# Patient Record
Sex: FEMALE | Race: WHITE | NOT HISPANIC OR LATINO | URBAN - METROPOLITAN AREA
[De-identification: names, ages, dates, MRNs, and addresses within clinical notes are randomized per-mention and may not be internally consistent; named-entity substitution may affect disease eponyms.]

---

## 2023-05-26 ENCOUNTER — HOSPITAL ENCOUNTER (OUTPATIENT)
Dept: RADIOLOGY | Facility: HOSPITAL | Age: 38
Discharge: HOME | End: 2023-05-26
Payer: COMMERCIAL

## 2023-05-26 DIAGNOSIS — Z12.31 SCREENING MAMMOGRAM, ENCOUNTER FOR: ICD-10-CM

## 2023-05-26 PROCEDURE — 77067 MAMMO DIGITAL SCREENING BILAT WITH TOMO: ICD-10-PCS | Mod: 26,,, | Performed by: RADIOLOGY

## 2023-05-26 PROCEDURE — 77063 MAMMO DIGITAL SCREENING BILAT WITH TOMO: ICD-10-PCS | Mod: 26,,, | Performed by: RADIOLOGY

## 2023-05-26 PROCEDURE — 77067 SCR MAMMO BI INCL CAD: CPT | Mod: 26,,, | Performed by: RADIOLOGY

## 2023-05-26 PROCEDURE — 77063 BREAST TOMOSYNTHESIS BI: CPT | Mod: 26,,, | Performed by: RADIOLOGY

## 2023-05-26 PROCEDURE — 77067 SCR MAMMO BI INCL CAD: CPT | Mod: TC,PN

## 2023-05-30 ENCOUNTER — TELEPHONE (OUTPATIENT)
Dept: RADIOLOGY | Facility: HOSPITAL | Age: 38
End: 2023-05-30
Payer: COMMERCIAL

## 2023-05-31 ENCOUNTER — TELEPHONE (OUTPATIENT)
Dept: RADIOLOGY | Facility: HOSPITAL | Age: 38
End: 2023-05-31
Payer: COMMERCIAL

## 2023-05-31 ENCOUNTER — TELEPHONE (OUTPATIENT)
Dept: OBSTETRICS AND GYNECOLOGY | Facility: HOSPITAL | Age: 38
End: 2023-05-31
Payer: COMMERCIAL

## 2023-05-31 DIAGNOSIS — R92.8 ABNORMAL MAMMOGRAM OF LEFT BREAST: Primary | ICD-10-CM

## 2023-05-31 NOTE — TELEPHONE ENCOUNTER
I spoke with patient regarding the status of her being scheduled for a diagnostic mammogram with US. I have not received the requested orders, I sent a message to patient's providers.

## 2023-05-31 NOTE — TELEPHONE ENCOUNTER
----- Message from Khadijah Guzman RT sent at 5/31/2023  1:00 PM CDT -----  Patient had an abnormal  mammogram screen on 5-26-23.  I am requesting an order for her to have a left breast diagnostic mammogram with a left breast US if needed.   Thank you, Khadijah

## 2023-06-02 ENCOUNTER — HOSPITAL ENCOUNTER (OUTPATIENT)
Dept: RADIOLOGY | Facility: HOSPITAL | Age: 38
Discharge: HOME | End: 2023-06-02
Attending: OBSTETRICS & GYNECOLOGY
Payer: COMMERCIAL

## 2023-06-02 DIAGNOSIS — R92.8 ABNORMAL MAMMOGRAM OF LEFT BREAST: ICD-10-CM

## 2023-06-02 PROCEDURE — 76642 ULTRASOUND BREAST LIMITED: CPT | Mod: TC,PO,LT

## 2023-06-02 PROCEDURE — 76642 ULTRASOUND BREAST LIMITED: CPT | Mod: 26,LT,, | Performed by: RADIOLOGY

## 2023-06-02 PROCEDURE — 76642 US BREAST LEFT LIMITED: ICD-10-PCS | Mod: 26,LT,, | Performed by: RADIOLOGY

## 2023-12-07 ENCOUNTER — HOSPITAL ENCOUNTER (OUTPATIENT)
Dept: RADIOLOGY | Facility: HOSPITAL | Age: 38
Discharge: HOME | End: 2023-12-07
Attending: OBSTETRICS & GYNECOLOGY
Payer: COMMERCIAL

## 2023-12-07 DIAGNOSIS — R92.8 ABNORMAL MAMMOGRAM OF LEFT BREAST: ICD-10-CM

## 2023-12-07 PROCEDURE — 76642 ULTRASOUND BREAST LIMITED: CPT | Mod: TC,PO,LT

## 2023-12-07 PROCEDURE — 76642 ULTRASOUND BREAST LIMITED: CPT | Mod: 26,LT,, | Performed by: RADIOLOGY

## 2023-12-07 PROCEDURE — 77065 DX MAMMO INCL CAD UNI: CPT | Mod: 26,LT,, | Performed by: RADIOLOGY

## 2023-12-07 PROCEDURE — 77065 DX MAMMO INCL CAD UNI: CPT | Mod: TC,PO,LT

## 2023-12-07 PROCEDURE — 77061 MAMMO DIGITAL DIAGNOSTIC LEFT WITH TOMO: ICD-10-PCS | Mod: 26,LT,, | Performed by: RADIOLOGY

## 2023-12-07 PROCEDURE — 77061 BREAST TOMOSYNTHESIS UNI: CPT | Mod: 26,LT,, | Performed by: RADIOLOGY

## 2023-12-07 PROCEDURE — 76642 US BREAST LEFT LIMITED: ICD-10-PCS | Mod: 26,LT,, | Performed by: RADIOLOGY

## 2023-12-07 PROCEDURE — 77065 MAMMO DIGITAL DIAGNOSTIC LEFT WITH TOMO: ICD-10-PCS | Mod: 26,LT,, | Performed by: RADIOLOGY

## 2024-01-27 DIAGNOSIS — R92.8 ABNORMAL MAMMOGRAM: Primary | ICD-10-CM

## 2024-06-07 ENCOUNTER — TELEPHONE (OUTPATIENT)
Dept: MATERNAL FETAL MEDICINE | Facility: CLINIC | Age: 39
End: 2024-06-07

## 2024-06-07 ENCOUNTER — HOSPITAL ENCOUNTER (OUTPATIENT)
Dept: RADIOLOGY | Facility: HOSPITAL | Age: 39
Discharge: HOME | End: 2024-06-07
Attending: OBSTETRICS & GYNECOLOGY
Payer: COMMERCIAL

## 2024-06-07 DIAGNOSIS — R92.8 ABNORMAL MAMMOGRAM: ICD-10-CM

## 2024-06-07 DIAGNOSIS — Z91.89 AT HIGH RISK FOR BREAST CANCER: Primary | ICD-10-CM

## 2024-06-07 DIAGNOSIS — Z36.89 ENCOUNTER FOR FETAL ANATOMIC SURVEY: ICD-10-CM

## 2024-06-07 DIAGNOSIS — O09.522 MULTIGRAVIDA OF ADVANCED MATERNAL AGE IN SECOND TRIMESTER: Primary | ICD-10-CM

## 2024-06-07 PROCEDURE — 76642 ULTRASOUND BREAST LIMITED: CPT | Mod: 26,LT,, | Performed by: RADIOLOGY

## 2024-06-07 PROCEDURE — 76642 ULTRASOUND BREAST LIMITED: CPT | Mod: TC,PO,LT

## 2024-06-07 PROCEDURE — 77062 BREAST TOMOSYNTHESIS BI: CPT | Mod: 26,,, | Performed by: RADIOLOGY

## 2024-06-07 PROCEDURE — 77062 BREAST TOMOSYNTHESIS BI: CPT | Mod: TC,PO

## 2024-06-07 PROCEDURE — 77066 DX MAMMO INCL CAD BI: CPT | Mod: 26,,, | Performed by: RADIOLOGY

## 2024-06-07 RX ORDER — METRONIDAZOLE 500 MG/1
1 TABLET ORAL 2 TIMES DAILY
COMMUNITY

## 2024-06-07 RX ORDER — AZELAIC ACID 0.15 G/G
AEROSOL, FOAM TOPICAL
COMMUNITY

## 2024-06-07 RX ORDER — BENZOYL PEROXIDE 50 MG/G
1 CREAM TOPICAL DAILY
COMMUNITY

## 2024-07-10 ENCOUNTER — OFFICE VISIT (OUTPATIENT)
Dept: MATERNAL FETAL MEDICINE | Facility: CLINIC | Age: 39
End: 2024-07-10
Payer: COMMERCIAL

## 2024-07-10 ENCOUNTER — ANESTHESIA EVENT (OUTPATIENT)
Dept: OBSTETRICS AND GYNECOLOGY | Facility: OTHER | Age: 39
End: 2024-07-10
Payer: COMMERCIAL

## 2024-07-10 ENCOUNTER — TELEPHONE (OUTPATIENT)
Dept: MATERNAL FETAL MEDICINE | Facility: CLINIC | Age: 39
End: 2024-07-10
Payer: COMMERCIAL

## 2024-07-10 ENCOUNTER — HOSPITAL ENCOUNTER (OUTPATIENT)
Facility: OTHER | Age: 39
Discharge: HOME | End: 2024-07-11
Attending: OBSTETRICS & GYNECOLOGY | Admitting: OBSTETRICS & GYNECOLOGY
Payer: COMMERCIAL

## 2024-07-10 ENCOUNTER — PROCEDURE VISIT (OUTPATIENT)
Dept: MATERNAL FETAL MEDICINE | Facility: CLINIC | Age: 39
End: 2024-07-10
Payer: COMMERCIAL

## 2024-07-10 ENCOUNTER — ANESTHESIA (OUTPATIENT)
Dept: OBSTETRICS AND GYNECOLOGY | Facility: OTHER | Age: 39
End: 2024-07-10
Payer: COMMERCIAL

## 2024-07-10 VITALS
DIASTOLIC BLOOD PRESSURE: 78 MMHG | HEART RATE: 100 BPM | SYSTOLIC BLOOD PRESSURE: 123 MMHG | HEIGHT: 66 IN | BODY MASS INDEX: 30.41 KG/M2 | WEIGHT: 189.25 LBS

## 2024-07-10 DIAGNOSIS — N88.3 SHORT CERVIX: Primary | ICD-10-CM

## 2024-07-10 DIAGNOSIS — O26.879 SHORT CERVIX AFFECTING PREGNANCY: ICD-10-CM

## 2024-07-10 DIAGNOSIS — Z36.89 ENCOUNTER FOR FETAL ANATOMIC SURVEY: ICD-10-CM

## 2024-07-10 DIAGNOSIS — O09.812 PREGNANCY RESULTING FROM IN VITRO FERTILIZATION IN SECOND TRIMESTER: Primary | ICD-10-CM

## 2024-07-10 DIAGNOSIS — D25.9 UTERINE FIBROIDS AFFECTING PREGNANCY IN SECOND TRIMESTER: ICD-10-CM

## 2024-07-10 DIAGNOSIS — O09.512 ADVANCED MATERNAL AGE, 1ST PREGNANCY, SECOND TRIMESTER: ICD-10-CM

## 2024-07-10 DIAGNOSIS — O26.872 SHORT CERVIX DURING PREGNANCY IN SECOND TRIMESTER: ICD-10-CM

## 2024-07-10 DIAGNOSIS — O34.12 UTERINE FIBROIDS AFFECTING PREGNANCY IN SECOND TRIMESTER: ICD-10-CM

## 2024-07-10 DIAGNOSIS — O09.522 MULTIGRAVIDA OF ADVANCED MATERNAL AGE IN SECOND TRIMESTER: ICD-10-CM

## 2024-07-10 LAB
ABO + RH BLD: NORMAL
BASOPHILS # BLD AUTO: 0.05 K/UL (ref 0–0.2)
BASOPHILS NFR BLD: 0.4 % (ref 0–1.9)
BLD GP AB SCN CELLS X3 SERPL QL: NORMAL
DIFFERENTIAL METHOD BLD: ABNORMAL
EOSINOPHIL # BLD AUTO: 0.1 K/UL (ref 0–0.5)
EOSINOPHIL NFR BLD: 1 % (ref 0–8)
ERYTHROCYTE [DISTWIDTH] IN BLOOD BY AUTOMATED COUNT: 13 % (ref 11.5–14.5)
HCT VFR BLD AUTO: 37.7 % (ref 37–48.5)
HGB BLD-MCNC: 12.3 G/DL (ref 12–16)
IMM GRANULOCYTES # BLD AUTO: 0.08 K/UL (ref 0–0.04)
IMM GRANULOCYTES NFR BLD AUTO: 0.6 % (ref 0–0.5)
LYMPHOCYTES # BLD AUTO: 2.9 K/UL (ref 1–4.8)
LYMPHOCYTES NFR BLD: 21.7 % (ref 18–48)
MCH RBC QN AUTO: 29.2 PG (ref 27–31)
MCHC RBC AUTO-ENTMCNC: 32.6 G/DL (ref 32–36)
MCV RBC AUTO: 90 FL (ref 82–98)
MONOCYTES # BLD AUTO: 1.3 K/UL (ref 0.3–1)
MONOCYTES NFR BLD: 9.5 % (ref 4–15)
NEUTROPHILS # BLD AUTO: 9 K/UL (ref 1.8–7.7)
NEUTROPHILS NFR BLD: 66.8 % (ref 38–73)
NRBC BLD-RTO: 0 /100 WBC
PLATELET # BLD AUTO: 233 K/UL (ref 150–450)
PMV BLD AUTO: 10.7 FL (ref 9.2–12.9)
RBC # BLD AUTO: 4.21 M/UL (ref 4–5.4)
SPECIMEN OUTDATE: NORMAL
WBC # BLD AUTO: 13.41 K/UL (ref 3.9–12.7)

## 2024-07-10 PROCEDURE — 85025 COMPLETE CBC W/AUTO DIFF WBC: CPT

## 2024-07-10 PROCEDURE — 86901 BLOOD TYPING SEROLOGIC RH(D): CPT

## 2024-07-10 PROCEDURE — 3008F BODY MASS INDEX DOCD: CPT | Mod: CPTII,S$GLB,, | Performed by: STUDENT IN AN ORGANIZED HEALTH CARE EDUCATION/TRAINING PROGRAM

## 2024-07-10 PROCEDURE — 11000001 HC ACUTE MED/SURG PRIVATE ROOM

## 2024-07-10 PROCEDURE — 1159F MED LIST DOCD IN RCRD: CPT | Mod: CPTII,S$GLB,, | Performed by: STUDENT IN AN ORGANIZED HEALTH CARE EDUCATION/TRAINING PROGRAM

## 2024-07-10 PROCEDURE — 3078F DIAST BP <80 MM HG: CPT | Mod: CPTII,S$GLB,, | Performed by: STUDENT IN AN ORGANIZED HEALTH CARE EDUCATION/TRAINING PROGRAM

## 2024-07-10 PROCEDURE — 99999 PR PBB SHADOW E&M-EST. PATIENT-LVL III: CPT | Mod: PBBFAC,,, | Performed by: STUDENT IN AN ORGANIZED HEALTH CARE EDUCATION/TRAINING PROGRAM

## 2024-07-10 PROCEDURE — 76817 TRANSVAGINAL US OBSTETRIC: CPT | Mod: S$GLB,,, | Performed by: STUDENT IN AN ORGANIZED HEALTH CARE EDUCATION/TRAINING PROGRAM

## 2024-07-10 PROCEDURE — 76811 OB US DETAILED SNGL FETUS: CPT | Mod: S$GLB,,, | Performed by: STUDENT IN AN ORGANIZED HEALTH CARE EDUCATION/TRAINING PROGRAM

## 2024-07-10 PROCEDURE — 1160F RVW MEDS BY RX/DR IN RCRD: CPT | Mod: CPTII,S$GLB,, | Performed by: STUDENT IN AN ORGANIZED HEALTH CARE EDUCATION/TRAINING PROGRAM

## 2024-07-10 PROCEDURE — G0379 DIRECT REFER HOSPITAL OBSERV: HCPCS

## 2024-07-10 PROCEDURE — G0378 HOSPITAL OBSERVATION PER HR: HCPCS

## 2024-07-10 PROCEDURE — 99205 OFFICE O/P NEW HI 60 MIN: CPT | Mod: S$GLB,,, | Performed by: STUDENT IN AN ORGANIZED HEALTH CARE EDUCATION/TRAINING PROGRAM

## 2024-07-10 PROCEDURE — 3074F SYST BP LT 130 MM HG: CPT | Mod: CPTII,S$GLB,, | Performed by: STUDENT IN AN ORGANIZED HEALTH CARE EDUCATION/TRAINING PROGRAM

## 2024-07-10 PROCEDURE — 99223 1ST HOSP IP/OBS HIGH 75: CPT | Mod: ,,, | Performed by: OBSTETRICS & GYNECOLOGY

## 2024-07-10 RX ORDER — SIMETHICONE 80 MG
1 TABLET,CHEWABLE ORAL EVERY 6 HOURS PRN
Status: DISCONTINUED | OUTPATIENT
Start: 2024-07-10 | End: 2024-07-11 | Stop reason: HOSPADM

## 2024-07-10 RX ORDER — DIPHENHYDRAMINE HYDROCHLORIDE 50 MG/ML
25 INJECTION INTRAMUSCULAR; INTRAVENOUS EVERY 4 HOURS PRN
Status: DISCONTINUED | OUTPATIENT
Start: 2024-07-10 | End: 2024-07-11 | Stop reason: HOSPADM

## 2024-07-10 RX ORDER — ASPIRIN 81 MG/1
81 TABLET ORAL DAILY
Status: DISCONTINUED | OUTPATIENT
Start: 2024-07-11 | End: 2024-07-11 | Stop reason: HOSPADM

## 2024-07-10 RX ORDER — PRENATAL WITH FERROUS FUM AND FOLIC ACID 3080; 920; 120; 400; 22; 1.84; 3; 20; 10; 1; 12; 200; 27; 25; 2 [IU]/1; [IU]/1; MG/1; [IU]/1; MG/1; MG/1; MG/1; MG/1; MG/1; MG/1; UG/1; MG/1; MG/1; MG/1; MG/1
1 TABLET ORAL DAILY
Status: DISCONTINUED | OUTPATIENT
Start: 2024-07-11 | End: 2024-07-11 | Stop reason: HOSPADM

## 2024-07-10 RX ORDER — ONDANSETRON 8 MG/1
8 TABLET, ORALLY DISINTEGRATING ORAL EVERY 8 HOURS PRN
Status: DISCONTINUED | OUTPATIENT
Start: 2024-07-10 | End: 2024-07-11 | Stop reason: HOSPADM

## 2024-07-10 RX ORDER — ASPIRIN 81 MG/1
81 TABLET ORAL DAILY
COMMUNITY

## 2024-07-10 RX ORDER — AMOXICILLIN 250 MG
1 CAPSULE ORAL NIGHTLY PRN
Status: DISCONTINUED | OUTPATIENT
Start: 2024-07-10 | End: 2024-07-11 | Stop reason: HOSPADM

## 2024-07-10 RX ORDER — SODIUM CHLORIDE, SODIUM LACTATE, POTASSIUM CHLORIDE, CALCIUM CHLORIDE 600; 310; 30; 20 MG/100ML; MG/100ML; MG/100ML; MG/100ML
INJECTION, SOLUTION INTRAVENOUS CONTINUOUS
Status: DISCONTINUED | OUTPATIENT
Start: 2024-07-11 | End: 2024-07-11 | Stop reason: HOSPADM

## 2024-07-10 RX ORDER — PRENATAL WITH FERROUS FUM AND FOLIC ACID 3080; 920; 120; 400; 22; 1.84; 3; 20; 10; 1; 12; 200; 27; 25; 2 [IU]/1; [IU]/1; MG/1; [IU]/1; MG/1; MG/1; MG/1; MG/1; MG/1; MG/1; UG/1; MG/1; MG/1; MG/1; MG/1
1 TABLET ORAL DAILY
Status: DISCONTINUED | OUTPATIENT
Start: 2024-07-11 | End: 2024-07-10 | Stop reason: SDUPTHER

## 2024-07-10 RX ORDER — DIPHENHYDRAMINE HCL 25 MG
25 CAPSULE ORAL EVERY 4 HOURS PRN
Status: DISCONTINUED | OUTPATIENT
Start: 2024-07-10 | End: 2024-07-11 | Stop reason: HOSPADM

## 2024-07-10 RX ORDER — SODIUM CHLORIDE 0.9 % (FLUSH) 0.9 %
10 SYRINGE (ML) INJECTION
Status: DISCONTINUED | OUTPATIENT
Start: 2024-07-10 | End: 2024-07-11 | Stop reason: HOSPADM

## 2024-07-10 RX ORDER — ACETAMINOPHEN 325 MG/1
650 TABLET ORAL EVERY 6 HOURS PRN
Status: DISCONTINUED | OUTPATIENT
Start: 2024-07-10 | End: 2024-07-11 | Stop reason: HOSPADM

## 2024-07-10 RX ORDER — PANTOPRAZOLE SODIUM 40 MG/1
40 TABLET, DELAYED RELEASE ORAL DAILY
COMMUNITY

## 2024-07-10 NOTE — ASSESSMENT & PLAN NOTE
Fibroids  I counseled the patient regarding fibroids in pregnancy. She was counseled on the risk for  delivery,  PROM, fibroid degeneration, poor fetal growth, oligohydramnios, vaginal bleeding, and in rare circumstances obstruction of lower uterus/cervix, impeding vaginal delivery.  Myomectomy is generally avoided in pregnancy due to concerns regarding pregnancy complications.    Recommendations:  If concern for degenerating fibroids, can administer a short course of NSAIDs (approximately 48-72 hours of Indocin or ibuprofen) prior to 32 weeks gestation  Document postpartum hemorrhage risk on admission to hospital, ensure T&S sent, and consider type and crossmatch depending on postpartum hemorrhage risk

## 2024-07-10 NOTE — ASSESSMENT & PLAN NOTE
Short cervix  On today's ultrasound, a short cervix is identified. I counseled the patient regarding the potential implications of a short cervix including the risk of  birth. We reviewed potential management options which include expectant management, nightly vaginal progesterone, and cerclage for cervical length 10 mm less based on a multicenter retrospective study. I reviewed the evidence behind of these management options in patients without a history of  birth.  Patient was counseled to avoid heavy lifting and moderate to high impact exercise. Pelvic rest can be considered but does not affect  birth risk. Bedrest is not recommended due to risk of thromboembolism and no proven benefit with respect to  delivery. I counseled the patient regarding the signs and symptoms or  labor. We reviewed the thresholds of viability and reviewed prematurity complications.   Patient was counseled on r/b/i/a of cerclage which include but are not limited to risk of anesthesia, pain, bleeding, infection, injury to adjacent structures, rupture of membranes, potential inability to place, and potential failure to prevent  delivery.  We discussed that if the cervix were to dilate, cerclage could be considered up until a gestational age of 22 weeks 6 days.     Recommendations:  Admit to [facility] for cerclage placement in the AM.  Strict PTL precautions reviewed with patient  I have scheduled her a one week f/u with me however this can also be completed at [facility]

## 2024-07-10 NOTE — TELEPHONE ENCOUNTER
Patient called and asked to be to Labor and delivery at 8 pm for admission.    Also informed of f/u appointment with Dr Jeffrey for Thurs, 7/18 8 am

## 2024-07-10 NOTE — ASSESSMENT & PLAN NOTE
In Vitro Fertilization  The use of IVF has been associated with an increase in preeclampsia, gestational hypertension, placental abruption, placenta previa, and risk of  delivery. Due to the potential increased risk of congenital malformations following IVF, a targeted anatomic ultrasound is recommended at 18-20 weeks. In addition, a fetal echocardiogram may be considered if the targeted images are incomplete given the 2-fold associated risk of congenital cardiac anomalies.  While the association between ART and these adverse outcomes raise some concern, it is important to note that the literature is contradictory on this subject and the chances of having a healthy child conceived with ART are overall extremely high.    Summary of Recommendations:  Targeted ultrasound for anatomical survey at 18-20 weeks. Follow up scheduled  Consider fetal echocardiogram at 22 weeks if cardiac views are suboptimal on detailed ultrasound.  Continue low dose aspirin 81 mg for preeclampsia risk reduction  Close monitoring for preeclampsia.  Fetal growth ultrasound at 32-34 weeks.   In addition, because of the increased risk of stillbirth noted in women at or over the age of 40 at time of delivery, we recommend weekly fetal surveillance (ex. NST/MVP) starting at 32 weeks until delivery in this group. (This is to be ordered/arranged by primary OB provider)  In this population of women over the age of 40 at time of delivery and for IVF pregnancies, consider delivery in the 39th week

## 2024-07-10 NOTE — ASSESSMENT & PLAN NOTE
Advanced Maternal Age (second trimester)  Today I counseled the patient on the relationship between maternal age and fetal aneuploidy.  We discussed the risks and benefits of screening tests versus definitive genetic testing (amniocentesis). She was counseled about her specific age-related risk of fetal aneuploidy. We discussed the limitations of ultrasound in the definitive diagnosis of fetal aneuploidy.  I quoted the patient a 1 in 900 procedure related risk of fetal loss with genetic amniocentesis.  We also discussed cell free fetal DNA screening including the sensitivity and specificity of the test.  Her questions were answered. She has a low risk cell free DNA and does not desire additional testing.   Additionally, we discussed the association between advanced maternal age (AMA) and preeclampsia, gestational diabetes, and fetal growth restriction.

## 2024-07-10 NOTE — PROGRESS NOTES
Report called to Nick RN at [facility] for patient to be a direct admit tonight for an ultrasound indicated cerclage tomorrow tbd by Dr Euceda.    Confirmation received from [facility].  Patient to be there at 8 pm.  See telephone encounter.

## 2024-07-10 NOTE — PROGRESS NOTES
"MATERNAL-FETAL MEDICINE   CONSULT NOTE    Provider requesting consultation: MD Gianfranco  SUBJECTIVE:   Ms. Chhaya Orellana is a 39 y.o.  female with IUP at 19w6d who is seen in consultation by MFM for evaluation and management of:  Problem   Short Cervix During Pregnancy in Second Trimester   Pregnancy Resulting From in Vitro Fertilization in Second Trimester   Advanced Maternal Age, 1st Pregnancy, Second Trimester   Uterine Fibroids Affecting Pregnancy in Second Trimester        Medication List with Changes/Refills   Current Medications    ASPIRIN (ECOTRIN) 81 MG EC TABLET    Take 81 mg by mouth once daily.                   PANTOPRAZOLE (PROTONIX) 40 MG TABLET    Take 40 mg by mouth once daily.    PNV 11-IRON FUM-FOLIC ACID-OM3 28 MG IRON-1 MG -200 MG CAP    Take by mouth.     Review of patient's allergies indicates:  No Known Allergies  OB History    Para Term  AB Living   1 0 0         SAB IAB Ectopic Multiple Live Births                  # Outcome Date GA Lbr Abhinav/2nd Weight Sex Type Anes PTL Lv   1 Current              No past medical history on file.  Past Surgical History:   Procedure Laterality Date    BASAL CELL CARCINOMA EXCISION       Family history: negative for birth defects, recurrent miscarriages, chromosomal abnormalities.   Social History     Tobacco Use    Smoking status: Never    Smokeless tobacco: Never     Review of patient's allergies indicates:  No Known Allergies  Objective:   /78   Pulse 100   Ht 5' 6" (1.676 m)   Wt 85.9 kg (189 lb 4.2 oz)   LMP 2024   BMI 30.55 kg/m²   Ultrasound performed. See viewpoint for full ultrasound report.  A detailed fetal anatomic ultrasound examination was performed for the following high risk indication: AMA,IVF.   No fetal structural malformations are identified; however, fetal imaging is incomplete today.   A follow-up study will be scheduled to complete the fetal anatomic survey.   Fetal size today is consistent with " established gestational age.   Short cervix measuring 9.7 mm on TV assessment   Placental location is posterior without evidence of previa.   Significant labs/imaging:  Cell free DNA low risk    SSE and SVE: cervix closed   ASSESSMENT/PLAN:     39 y.o.  female with IUP at 19w6d     Pregnancy resulting from in vitro fertilization in second trimester  In Vitro Fertilization  The use of IVF has been associated with an increase in preeclampsia, gestational hypertension, placental abruption, placenta previa, and risk of  delivery. Due to the potential increased risk of congenital malformations following IVF, a targeted anatomic ultrasound is recommended at 18-20 weeks. In addition, a fetal echocardiogram may be considered if the targeted images are incomplete given the 2-fold associated risk of congenital cardiac anomalies.  While the association between ART and these adverse outcomes raise some concern, it is important to note that the literature is contradictory on this subject and the chances of having a healthy child conceived with ART are overall extremely high.    Summary of Recommendations:  Targeted ultrasound for anatomical survey at 18-20 weeks. Follow up scheduled  Consider fetal echocardiogram at 22 weeks if cardiac views are suboptimal on detailed ultrasound.  Continue low dose aspirin 81 mg for preeclampsia risk reduction  Close monitoring for preeclampsia.  Fetal growth ultrasound at 32-34 weeks.   In addition, because of the increased risk of stillbirth noted in women at or over the age of 40 at time of delivery, we recommend weekly fetal surveillance (ex. NST/MVP) starting at 32 weeks until delivery in this group. (This is to be ordered/arranged by primary OB provider)  In this population of women over the age of 40 at time of delivery and for IVF pregnancies, consider delivery in the 39th week        Advanced maternal age, 1st pregnancy, second trimester  Advanced Maternal Age (second  trimester)  Today I counseled the patient on the relationship between maternal age and fetal aneuploidy.  We discussed the risks and benefits of screening tests versus definitive genetic testing (amniocentesis). She was counseled about her specific age-related risk of fetal aneuploidy. We discussed the limitations of ultrasound in the definitive diagnosis of fetal aneuploidy.  I quoted the patient a 1 in 900 procedure related risk of fetal loss with genetic amniocentesis.  We also discussed cell free fetal DNA screening including the sensitivity and specificity of the test.  Her questions were answered. She has a low risk cell free DNA and does not desire additional testing.   Additionally, we discussed the association between advanced maternal age (AMA) and preeclampsia, gestational diabetes, and fetal growth restriction.        Uterine fibroids affecting pregnancy in second trimester  Fibroids  I counseled the patient regarding fibroids in pregnancy. She was counseled on the risk for  delivery,  PROM, fibroid degeneration, poor fetal growth, oligohydramnios, vaginal bleeding, and in rare circumstances obstruction of lower uterus/cervix, impeding vaginal delivery.  Myomectomy is generally avoided in pregnancy due to concerns regarding pregnancy complications.    Recommendations:  If concern for degenerating fibroids, can administer a short course of NSAIDs (approximately 48-72 hours of Indocin or ibuprofen) prior to 32 weeks gestation  Document postpartum hemorrhage risk on admission to hospital, ensure T&S sent, and consider type and crossmatch depending on postpartum hemorrhage risk      Short cervix during pregnancy in second trimester  Short cervix  On today's ultrasound, a short cervix is identified. I counseled the patient regarding the potential implications of a short cervix including the risk of  birth. We reviewed potential management options which include expectant management,  nightly vaginal progesterone, and cerclage for cervical length 10 mm less based on a multicenter retrospective study. I reviewed the evidence behind of these management options in patients without a history of  birth.  Patient was counseled to avoid heavy lifting and moderate to high impact exercise. Pelvic rest can be considered but does not affect  birth risk. Bedrest is not recommended due to risk of thromboembolism and no proven benefit with respect to  delivery. I counseled the patient regarding the signs and symptoms or  labor. We reviewed the thresholds of viability and reviewed prematurity complications.   Patient was counseled on r/b/i/a of cerclage which include but are not limited to risk of anesthesia, pain, bleeding, infection, injury to adjacent structures, rupture of membranes, potential inability to place, and potential failure to prevent  delivery.  We discussed that if the cervix were to dilate, cerclage could be considered up until a gestational age of 22 weeks 6 days.     Recommendations:  Admit to [facility] for cerclage placement in the AM.  Strict PTL precautions reviewed with patient  I have scheduled her a one week f/u with me however this can also be completed at [facility]      FOLLOW UP:   4-6 wks for follow up anatomy      Ramón Jeffrey  Maternal-Fetal Medicine    Electronically Signed by Ramón Jeffrey July 10, 2024

## 2024-07-11 VITALS
TEMPERATURE: 98 F | DIASTOLIC BLOOD PRESSURE: 65 MMHG | OXYGEN SATURATION: 98 % | HEART RATE: 100 BPM | RESPIRATION RATE: 18 BRPM | SYSTOLIC BLOOD PRESSURE: 125 MMHG

## 2024-07-11 PROBLEM — O16.2 ELEVATED BLOOD PRESSURE AFFECTING PREGNANCY IN SECOND TRIMESTER, ANTEPARTUM: Status: ACTIVE | Noted: 2024-07-11

## 2024-07-11 PROBLEM — N88.3 SHORT CERVIX: Status: ACTIVE | Noted: 2024-07-10

## 2024-07-11 PROCEDURE — 71000039 HC RECOVERY, EACH ADD'L HOUR: Performed by: OBSTETRICS & GYNECOLOGY

## 2024-07-11 PROCEDURE — 59320 REVISION OF CERVIX: CPT | Mod: ,,, | Performed by: OBSTETRICS & GYNECOLOGY

## 2024-07-11 PROCEDURE — 63600175 PHARM REV CODE 636 W HCPCS

## 2024-07-11 PROCEDURE — 27200688 HC TRAY, SPINAL-HYPER/ ISOBARIC: Performed by: ANESTHESIOLOGY

## 2024-07-11 PROCEDURE — 25000003 PHARM REV CODE 250

## 2024-07-11 PROCEDURE — 36004723: Performed by: OBSTETRICS & GYNECOLOGY

## 2024-07-11 PROCEDURE — 71000033 HC RECOVERY, INTIAL HOUR: Performed by: OBSTETRICS & GYNECOLOGY

## 2024-07-11 PROCEDURE — D9220A PRA ANESTHESIA: Mod: ,,, | Performed by: ANESTHESIOLOGY

## 2024-07-11 PROCEDURE — 36004722: Performed by: OBSTETRICS & GYNECOLOGY

## 2024-07-11 PROCEDURE — 76998 US GUIDE INTRAOP: CPT | Mod: 26,,, | Performed by: OBSTETRICS & GYNECOLOGY

## 2024-07-11 PROCEDURE — 37000009 HC ANESTHESIA EA ADD 15 MINS: Performed by: OBSTETRICS & GYNECOLOGY

## 2024-07-11 PROCEDURE — 37000008 HC ANESTHESIA 1ST 15 MINUTES: Performed by: OBSTETRICS & GYNECOLOGY

## 2024-07-11 PROCEDURE — G0378 HOSPITAL OBSERVATION PER HR: HCPCS

## 2024-07-11 RX ORDER — BUPIVACAINE HYDROCHLORIDE 7.5 MG/ML
INJECTION, SOLUTION INTRASPINAL
Status: DISCONTINUED | OUTPATIENT
Start: 2024-07-11 | End: 2024-07-15

## 2024-07-11 RX ORDER — ONDANSETRON HYDROCHLORIDE 2 MG/ML
INJECTION, SOLUTION INTRAVENOUS
Status: DISCONTINUED | OUTPATIENT
Start: 2024-07-11 | End: 2024-07-15

## 2024-07-11 RX ORDER — FENTANYL CITRATE 50 UG/ML
INJECTION, SOLUTION INTRAMUSCULAR; INTRAVENOUS
Status: DISCONTINUED | OUTPATIENT
Start: 2024-07-11 | End: 2024-07-15

## 2024-07-11 RX ORDER — SODIUM CITRATE AND CITRIC ACID MONOHYDRATE 334; 500 MG/5ML; MG/5ML
30 SOLUTION ORAL ONCE
Status: COMPLETED | OUTPATIENT
Start: 2024-07-11 | End: 2024-07-11

## 2024-07-11 RX ORDER — FAMOTIDINE 20 MG/1
20 TABLET, FILM COATED ORAL 2 TIMES DAILY
Status: DISCONTINUED | OUTPATIENT
Start: 2024-07-11 | End: 2024-07-11

## 2024-07-11 RX ORDER — DEXAMETHASONE SODIUM PHOSPHATE 4 MG/ML
INJECTION, SOLUTION INTRA-ARTICULAR; INTRALESIONAL; INTRAMUSCULAR; INTRAVENOUS; SOFT TISSUE
Status: DISCONTINUED | OUTPATIENT
Start: 2024-07-11 | End: 2024-07-15

## 2024-07-11 RX ORDER — SODIUM CHLORIDE, SODIUM LACTATE, POTASSIUM CHLORIDE, CALCIUM CHLORIDE 600; 310; 30; 20 MG/100ML; MG/100ML; MG/100ML; MG/100ML
INJECTION, SOLUTION INTRAVENOUS CONTINUOUS PRN
Status: DISCONTINUED | OUTPATIENT
Start: 2024-07-11 | End: 2024-07-15

## 2024-07-11 RX ORDER — FAMOTIDINE 10 MG/ML
20 INJECTION INTRAVENOUS
Status: COMPLETED | OUTPATIENT
Start: 2024-07-11 | End: 2024-07-11

## 2024-07-11 RX ADMIN — SODIUM CITRATE AND CITRIC ACID MONOHYDRATE 30 ML: 500; 334 SOLUTION ORAL at 11:07

## 2024-07-11 RX ADMIN — ACETAMINOPHEN 650 MG: 325 TABLET, FILM COATED ORAL at 03:07

## 2024-07-11 RX ADMIN — FENTANYL CITRATE 10 MCG: 50 INJECTION INTRAMUSCULAR; INTRAVENOUS at 12:07

## 2024-07-11 RX ADMIN — FAMOTIDINE 20 MG: 10 INJECTION, SOLUTION INTRAVENOUS at 11:07

## 2024-07-11 RX ADMIN — DEXAMETHASONE SODIUM PHOSPHATE 4 MG: 4 INJECTION INTRA-ARTICULAR; INTRALESIONAL; INTRAMUSCULAR; INTRAVENOUS; SOFT TISSUE at 12:07

## 2024-07-11 RX ADMIN — SODIUM CHLORIDE, POTASSIUM CHLORIDE, SODIUM LACTATE AND CALCIUM CHLORIDE: 600; 310; 30; 20 INJECTION, SOLUTION INTRAVENOUS at 12:07

## 2024-07-11 RX ADMIN — BUPIVACAINE HYDROCHLORIDE IN DEXTROSE 1.4 ML: 7.5 INJECTION, SOLUTION SUBARACHNOID at 12:07

## 2024-07-11 RX ADMIN — ONDANSETRON 4 MG: 2 INJECTION INTRAMUSCULAR; INTRAVENOUS at 12:07

## 2024-07-11 RX ADMIN — SODIUM CHLORIDE, SODIUM LACTATE, POTASSIUM CHLORIDE, AND CALCIUM CHLORIDE: 600; 310; 30; 20 INJECTION, SOLUTION INTRAVENOUS at 12:07

## 2024-07-11 NOTE — NURSING
RN reviewed discharge paper work with patient. Rn reviewed post cervical cerclage warning signs and when to call MD. Pt verbalized understanding. No questions at this time. RN wheeled pt to the second floor of the [facility].

## 2024-07-11 NOTE — ANESTHESIA PROCEDURE NOTES
Spinal    Diagnosis: IUP  Patient location during procedure: OR  Start time: 7/11/2024 12:21 PM  Timeout: 7/11/2024 12:21 PM  End time: 7/11/2024 12:23 PM    Staffing  Authorizing Provider: Vasyl Hogan III, MD  Performing Provider: Adelina Wylie DO    Staffing  Performed by: Adelina Wylie DO  Authorized by: Vasyl Hogan III, MD    Preanesthetic Checklist  Completed: patient identified, IV checked, site marked, risks and benefits discussed, surgical consent, monitors and equipment checked, pre-op evaluation and timeout performed  Spinal Block  Patient position: sitting  Prep: ChloraPrep  Patient monitoring: heart rate, cardiac monitor, continuous pulse ox and frequent blood pressure checks  Approach: midline  Location: L3-4  Injection technique: single shot  CSF Fluid: clear free-flowing CSF  Needle  Needle type: Haider   Needle gauge: 25 G  Needle length: 3.5 in  Additional Documentation: incremental injection, negative aspiration for heme and no paresthesia on injection  Needle localization: anatomical landmarks  Assessment  Sensory level: T11   Dermatomal levels determined by pinch or prick  Ease of block: easy  Patient's tolerance of the procedure: comfortable throughout block and no complaints

## 2024-07-11 NOTE — PLAN OF CARE
Problem: Adult Inpatient Plan of Care  Goal: Plan of Care Review  Outcome: Progressing  Goal: Patient-Specific Goal (Individualized)  Outcome: Progressing  Goal: Absence of Hospital-Acquired Illness or Injury  Outcome: Progressing  Goal: Optimal Comfort and Wellbeing  Outcome: Progressing  Goal: Readiness for Transition of Care  Outcome: Progressing     Problem:  Fall Injury Risk  Goal: Absence of Fall, Infant Drop and Related Injury  Outcome: Progressing     Problem: Hospitalized  Patient  Goal: Optimal Patient-Fetal Wellbeing  Outcome: Progressing     Problem: Cervical Cerclage  Goal: Absence of Bleeding  Outcome: Progressing  Goal: Effective Bowel Elimination  Outcome: Progressing  Goal: Optimal Patient and Fetal Wellbeing  Outcome: Progressing  Goal: Fluid and Electrolyte Balance  Outcome: Progressing  Goal: Absence of Infection Signs and Symptoms  Outcome: Progressing  Goal: Anesthesia/Sedation Recovery  Outcome: Progressing  Goal: Optimal Pain Control and Function  Outcome: Progressing  Goal: Nausea and Vomiting Relief  Outcome: Progressing  Goal: Effective Urinary Elimination  Outcome: Progressing  Goal: Effective Oxygenation and Ventilation  Outcome: Progressing     Problem: Fall Injury Risk  Goal: Absence of Fall and Fall-Related Injury  Outcome: Progressing     VSS. Heart tones verified. Denies VB, LOF, ctxns. TOCO negative for ctxns throughout the night. NPO since 0000. Pt verbalized anxiety about procedure and restless throughout the night. S/O at bedside during the night. No callouts. Will continue to monitor until end of shift.

## 2024-07-11 NOTE — H&P
[facility] - Antepartum  Obstetrics & Gynecology  History & Physical    Patient Name: Chhaya Orellana  MRN: [id number]  Admission Date: 7/10/2024  Primary OB Provider: Clarissa Medel MD    Subjective:     Chief Complaint/Reason for Admission: Short Cervix Affecting Pregnancy    History of Present Illness: Ms. Chhaya Orellana is a 39 y.o.  F at 19w6d who is being admitted for ultrasound-indicated cerclage placement given short cervix noted on anatomy ultrasound today.     No current facility-administered medications on file prior to encounter.     Current Outpatient Medications on File Prior to Encounter   Medication Sig    aspirin (ECOTRIN) 81 MG EC tablet Take 81 mg by mouth once daily.    pantoprazole (PROTONIX) 40 MG tablet Take 40 mg by mouth once daily.    PNV 11-iron fum-folic acid-om3 28 mg iron-1 mg -200 mg Cap Take by mouth.    [DISCONTINUED] azelaic acid (FINACEA) 15 % Foam  (Patient not taking: Reported on 7/10/2024)    [DISCONTINUED] benzoyl peroxide (EPSOLAY) 5 % Crea 1 application  once daily. (Patient not taking: Reported on 7/10/2024)    [DISCONTINUED] metroNIDAZOLE (FLAGYL) 500 MG tablet Take 1 tablet by mouth 2 (two) times daily.       Review of patient's allergies indicates:  No Known Allergies    No past medical history on file.  OB History    Para Term  AB Living   1 0 0         SAB IAB Ectopic Multiple Live Births                  # Outcome Date GA Lbr Abhinav/2nd Weight Sex Type Anes PTL Lv   1 Current              Past Surgical History:   Procedure Laterality Date    BASAL CELL CARCINOMA EXCISION       Family History       Problem Relation (Age of Onset)    Breast cancer Maternal Aunt, Paternal Aunt, Cousin          Tobacco Use    Smoking status: Never    Smokeless tobacco: Never   Substance and Sexual Activity    Alcohol use: Not on file    Drug use: Not on file    Sexual activity: Not on file     Review of Systems   Constitutional:  Negative for chills and fever.   Respiratory:   Negative for shortness of breath.    Cardiovascular:  Negative for chest pain.   Gastrointestinal:  Negative for abdominal pain, diarrhea and vomiting.   Genitourinary:  Negative for vaginal bleeding and vaginal discharge.   Neurological:  Negative for headaches.   Psychiatric/Behavioral:  Negative for depression. The patient is not nervous/anxious.      Objective:     Vital Signs (Most Recent):  Temp: 98.7 °F (37.1 °C) (07/10/24 2040)  Pulse: 91 (07/10/24 2040)  Resp: 14 (07/10/24 2040)  BP: 127/63 (Repeat nml. MD notified. No labs needed.) (07/10/24 2120)  SpO2: 98 % (07/10/24 2040) Vital Signs (24h Range):  Temp:  [98.7 °F (37.1 °C)] 98.7 °F (37.1 °C)  Pulse:  [] 91  Resp:  [14] 14  SpO2:  [98 %] 98 %  BP: (123-143)/(63-78) 127/63        There is no height or weight on file to calculate BMI.  Patient's last menstrual period was 02/22/2024.    Physical Exam:   Constitutional: She appears well-developed and well-nourished. No distress.    HENT:   Head: Normocephalic and atraumatic.    Eyes: Conjunctivae are normal.      Pulmonary/Chest: Effort normal. No respiratory distress.                       Skin: Skin is warm and dry.    Psychiatric: She has a normal mood and affect. Her behavior is normal. Thought content normal.       Laboratory:  CBC:   Recent Labs   Lab 07/10/24  2119   WBC 13.41*   RBC 4.21   HGB 12.3   HCT 37.7      MCV 90   MCH 29.2   MCHC 32.6       Diagnostic Results:  See Anatomy Ultrasound report from 7/10/24.    Assessment/Plan:     Active Diagnoses:    Diagnosis Date Noted POA    PRINCIPAL PROBLEM:  Short cervix affecting pregnancy [O26.879] 07/10/2024 Unknown    Pregnancy resulting from in vitro fertilization in second trimester [O09.812] 07/10/2024 Not Applicable    Advanced maternal age, 1st pregnancy, second trimester [O09.512] 07/10/2024 Yes    Uterine fibroids affecting pregnancy in second trimester [O34.12, D25.9] 07/10/2024 Yes      Problems Resolved During this Admission:        Ms. Chhaya Orellana is a 39 y.o.  F at 19w6d who is being admitted for ultrasound-indicated cerclage placement given short cervix noted on anatomy ultrasound today.     Short Cervix Affecting Pregnancy  - Short cervix measuring 9.7mm on TV assessment today  - Admitted for ultrasound-indicated cerclage placement on 24  - Cerclage, delivery, and blood transfusion consents signed and in chart   - NPO + IVF at midnight   - FHT qD  - TOCO: continuous monitoring    AMA  - cfDNA negative    IVF pregnancy  - cfDNA negative    Uterine Leiomyomas  - Posterior retroplacental fibroid measuring 4.5 x 3.7 x 4.2cm     Elevated BP  - Single elevated systolic BP to 143 shortly after admission for obs on 7/10 at 19w6d  - No prior elevations in Referral records from Dr. Gianfranco Napier MD  Obstetrics & Gynecology  [facility] - Antepartum    Fellow attestation. Patient is a 39 y.o.  at 20w0d admitted to [facility] service for placement of ultrasound indicated cerclage. Pregnancy followed by [facility] due to AMA and in vitro fertilization. Finding of shortened cervical length of 9.7 mm on routine anatomy ultrasound on 7/10. No complaints of contractions, LOF, VB overnight.     Patient has been counseled on options going forward including our recommendation for cerclage placement. Counseled on risks and benefits, desires to proceed. All questions answered.     Tabby Gaitan MD  PGY 7  Maternal Fetal Medicine  [facility]

## 2024-07-11 NOTE — DISCHARGE INSTRUCTIONS
Call clinic [phone] or L & D after hours at [phone] for vaginal bleeding, leakage of fluids, regular contractions every 5 mins for 2 hours, decreased fetal movements ( 10 kicks in 2 hours), headache not relieved by Tylenol, blurry vision, or temp of 100.4 or greater.  Begin doing fetal kick counts, at least 10 movements in 2 hours starting at 28 weeks gestation.  Keep next clinic appointment

## 2024-07-11 NOTE — OP NOTE
Operative Note    Date of procedure: 2024    Indications: 39 y.o.      Pre-operative Diagnosis:   1. Short cervix [N88.3]   2. Intrauterine pregnancy at 20 weeks  3. AMA   4. IVF pregnancy   5. Uterine Leiomyomas   6. Elevated Bp x 1     Post-operative Diagnosis:   1. Short cervix [N88.3]   2. Intrauterine pregnancy at 20 weeks  3. AMA   4. IVF pregnancy   5. Uterine Leiomyomas   6. Elevated Bp x 1  7. S/p cerclage placement    Procedure:   US indicated Coreas Cerclage    Surgeons and Role:     * Crescencio Euceda III, MD - Primary     * Tabby Gaitan MD- Fellow- Assisting      * Bindu Lugo MD - Resident - Assisting     Anesthesia: Neuraxia     Findings:   1. Uterus consistent with 20 weeks gestation.   2. Speculum exam revealed closed cervical os with approximately 1 cm on cervical body externally. No bleeding or discharge.   3. Upon completion of the procedure, single cerclage knot at 12 o'clock.     Estimated Blood Loss:   5 mL    UOP: 200 mL                  Complications:  None; patient tolerated the procedure well.           Disposition: Recovery           Condition: stable     INDICATIONS:  Chhaya Orellana is a 39 y.o. female  presents from clinic with short cervix measuring 9 mm on TVCL without evidence of dilation. Consents have been signed and reviewed.  Questions have been answered.    PROCEDURE:  Patient was taken to the operating room where spinal anesthesia was administered and found to be adequate.  She was prepped and draped in normal sterile fashion. A weighted sterile speculum was placed. The anterior lip of the cervix was grasped with an allis clamp.  Using #1 ethibond, a suture was placed in a circumferential fashion around the cervix being careful to avoid the bladder and the rectum. Once cerclage placed, anterior placement of  was noted to be cephalad  to the level of vesicovaginal fold. Decision made to remove suture in its entirety. Cerclage placement was  attempted again.  Once the cerclage was placed, the suture was cinched down and tied at the 12 o'clock position.      All instruments were removed from the vagina.  Sponge, lap, needle and instrument count was correct x 2.  Patient was taken to the recovery room in stable condition.      Fetal heart tones were confirmed prior to and following procedure.    Bindu Lugo MD PGY-3  Obstetrics and Gynecology  [facility]

## 2024-07-11 NOTE — PROGRESS NOTES
Maternal Fetal Medicine  Progress Note          Subjective:    Chhaya Orellana is a 39 y.o.  at 20w0d admitted for short cervix and rescue cerclage placement. Please see H&P for details regarding presentation at admission. This pregnancy is complicated by IVF pregnancy, AMA, Leiomyoma (4.5 x 3.7 x 4.2cm Post Retroplacental) .    Interim HPI: Denies contractions, vaginal bleeding, and leakage of fluids.     Medical, Surgical, Social, Family, and Obstetric History: reviewed in chart  Current Medications:    aspirin  81 mg Oral Daily    prenatal vitamin  1 tablet Oral Daily      Current Facility-Administered Medications:     acetaminophen, 650 mg, Oral, Q6H PRN    diphenhydrAMINE, 25 mg, Oral, Q4H PRN    diphenhydrAMINE, 25 mg, Intravenous, Q4H PRN    ondansetron, 8 mg, Oral, Q8H PRN    senna-docusate 8.6-50 mg, 1 tablet, Oral, Nightly PRN    simethicone, 1 tablet, Oral, Q6H PRN    sodium chloride 0.9%, 10 mL, Intravenous, PRN   Objective:   /71 (BP Location: Left arm, Patient Position: Sitting)   Pulse 103   Temp 98.3 °F (36.8 °C) (Oral)   Resp 16   LMP 2024   SpO2 98%   Breastfeeding No     Focused Physical Examination   General: well developed, no acute distress  Pulmonary: respiratory effort normal with no retractions  Abdomen: gravid  Cervix:  Per Dr. Jeffrey,  on SSE/SVE cervix closed     Fetal Monitoring  EFM: FHT confirmed 140  Tocometer: No contractions    Lab Results  Lab Results   Component Value Date    WBC 13.41 (H) 07/10/2024    HGB 12.3 07/10/2024    HCT 37.7 07/10/2024    MCV 90 07/10/2024     07/10/2024     Assessment:   39 y.o.  at 20w0d admitted for short cervix and rescue cerclage placement   Plan:   Short Cervix   - CL 9.7mm on TVUS yesterday   - Cerclage, blood, and delivery consents signed   - NPO since midnight   - No contractions on tocometry overnight   - FHT to be confirmed before cerclage placement   - To OR     Remainder of plan per H&P by   Karthikeyan Lugo MD PGY-3  Obstetrics and Gynecology  [facility]    Fellow attestation. Patient is a 39 y.o.  at 20w0d admitted to [facility] service for placement of ultrasound indicated cerclage. Pregnancy followed by [facility] due to AMA and in vitro fertilization. Finding of shortened cervical length of 9.7 mm on routine anatomy ultrasound on 7/10. No complaints of contractions, LOF, VB overnight.      Patient counseled on options going forward including our recommendation for cerclage placement. Tolerated procedure well, see operative report. Plan for follow up with Dr. Jeffrey in 1 week.      Tabby Gaitan MD  PGY 7  Maternal Fetal Medicine  [facility]

## 2024-07-11 NOTE — NURSING
Dr. Lugo called to bedside for assessment of questionable ROM; SSE performed; No pooling noted, additional tests obtained by MD.

## 2024-07-11 NOTE — ANESTHESIA PREPROCEDURE EVALUATION
"[facility]  Anesthesia Pre-Operative Evaluation         Patient Name: Chhaya Orellana  YOB: 1985  MRN: [id number]    07/10/2024    Chhaya Orellana is a 39 y.o. female  @ 19w6d who presents to antepartum with short cervix. This IUP is c/b short cervix, AMA, and IVF. Plans for cerclage in AM.    Denies cardiopulmonary, bleeding, or spine disorders. No previous back surgeries or anticoagulant use. No problems with past neuraxial anesthesia.      OB History    Para Term  AB Living   1 0 0         SAB IAB Ectopic Multiple Live Births                  # Outcome Date GA Lbr Abhinav/2nd Weight Sex Type Anes PTL Lv   1 Current                Review of patient's allergies indicates:  No Known Allergies    Wt Readings from Last 1 Encounters:   07/10/24 1248 85.9 kg (189 lb 4.2 oz)       BP Readings from Last 3 Encounters:   07/10/24 123/78       Patient Active Problem List   Diagnosis    Pregnancy resulting from in vitro fertilization in second trimester    Advanced maternal age, 1st pregnancy, second trimester    Uterine fibroids affecting pregnancy in second trimester    Short cervix during pregnancy in second trimester       Past Surgical History:   Procedure Laterality Date    BASAL CELL CARCINOMA EXCISION         Social History     Socioeconomic History    Marital status:    Tobacco Use    Smoking status: Never    Smokeless tobacco: Never         Chemistry        Component Value Date/Time     (L) 2024 1745    K 3.9 2024 1745     2024 1745    CO2 20 (L) 2024 1745    BUN 6 (L) 2024 1745    CREATININE 0.69 2024 1745    GLU 96 2024 1745        Component Value Date/Time    CALCIUM 8.7 2024 1745    CALCIUM 8.7 2024 1745            Lab Results   Component Value Date    WBC 6.37 2004    HGB 13.2 2004    HCT 40.2 2004    MCV 92.0 2004     2004       No results for input(s): "PT", " ""INR", "PROTIME", "APTT" in the last 72 hours.                                                                                                                   07/10/2024  Chhaya Orellana is a 39 y.o., female.      Pre-op Assessment    I have reviewed the Patient Summary Reports.     I have reviewed the Nursing Notes. I have reviewed the NPO Status.   I have reviewed the Medications.     Review of Systems  Anesthesia Hx:  No problems with previous Anesthesia   Neg history of prior surgery.          Denies Family Hx of Anesthesia complications.    Denies Personal Hx of Anesthesia complications.                    Cardiovascular:  Exercise tolerance: good    Denies Hypertension.    Denies CAD.    Denies CABG/stent.     Denies CHF.    no hyperlipidemia                             Pulmonary:    Denies COPD.  Denies Asthma.     Denies Sleep Apnea.                Renal/:   Denies Chronic Renal Disease.                Hepatic/GI:      Denies GERD.             Neurological:    Denies CVA.    Denies Headaches. Denies Seizures.                                Endocrine:  Denies Diabetes.         Denies Obesity / BMI > 30  Psych:  Denies Psychiatric History.                  Physical Exam  General: Well nourished, Cooperative, Alert and Oriented    Airway:  Mallampati: II / II  Mouth Opening: Normal  TM Distance: Normal  Tongue: Normal  Neck ROM: Normal ROM    Dental:  Intact        Anesthesia Plan  Type of Anesthesia, risks & benefits discussed:    Anesthesia Type: Epidural, Spinal, CSE, Regional, Gen ETT  Intra-op Monitoring Plan: Standard ASA Monitors  Post Op Pain Control Plan: multimodal analgesia, IV/PO Opioids PRN, epidural analgesia and intrathecal opioid  Induction:  IV  Airway Plan: Video and Direct, Post-Induction  Informed Consent: Informed consent signed with the Patient and all parties understand the risks and agree with anesthesia plan.  All questions answered. Patient consented to blood products? Yes  ASA Score: " 2  Day of Surgery Review of History & Physical: H&P Update referred to the surgeon/provider.    Ready For Surgery From Anesthesia Perspective.     .

## 2024-07-11 NOTE — ANESTHESIA POSTPROCEDURE EVALUATION
Anesthesia Post Evaluation    Patient: Chhaya Orellana    Procedure(s) Performed: Procedure(s) (LRB):  CERCLAGE, CERVIX (N/A)    Final Anesthesia Type: spinal      Patient location during evaluation: labor & delivery  Patient participation: Yes- Able to Participate  Level of consciousness: awake and alert  Post-procedure vital signs: reviewed and stable  Pain management: adequate  Airway patency: patent  JESSICA mitigation strategies: Multimodal analgesia  PONV status at discharge: No PONV  Anesthetic complications: no      Cardiovascular status: stable and blood pressure returned to baseline  Respiratory status: unassisted, spontaneous ventilation and room air  Hydration status: euvolemic  Follow-up not needed.              Vitals Value Taken Time   /65 07/11/24 1621   Temp  07/11/24 1737   Pulse 100 07/11/24 1621   Resp  07/11/24 1737   SpO2 98 % 07/11/24 1621         Event Time   Out of Recovery 07/11/2024 16:30:00         Pain/Rosita Score: Pain Rating Prior to Med Admin: 5 (7/11/2024  3:48 PM)  Pain Rating Post Med Admin: 0 (7/11/2024  4:24 PM)

## 2024-07-12 NOTE — PROGRESS NOTES
CRYSTAL US report and note faxed to OB office.  Op note and discharge summary post cerclage faxed to OB office, Dr. Medel.

## 2024-07-15 ENCOUNTER — TELEPHONE (OUTPATIENT)
Dept: MATERNAL FETAL MEDICINE | Facility: CLINIC | Age: 39
End: 2024-07-15
Payer: COMMERCIAL

## 2024-07-15 NOTE — TELEPHONE ENCOUNTER
Pt called in concerned that she would not have enough time to speak with Dr. Jeffrey. Relieved concerns for pt letting her know we just had to stick her in a spot to get her on the schedule but she is welcome to ask her questions.

## 2024-07-16 ENCOUNTER — PATIENT MESSAGE (OUTPATIENT)
Dept: MATERNAL FETAL MEDICINE | Facility: CLINIC | Age: 39
End: 2024-07-16
Payer: COMMERCIAL

## 2024-07-18 ENCOUNTER — OFFICE VISIT (OUTPATIENT)
Dept: MATERNAL FETAL MEDICINE | Facility: CLINIC | Age: 39
End: 2024-07-18
Payer: COMMERCIAL

## 2024-07-18 ENCOUNTER — PROCEDURE VISIT (OUTPATIENT)
Dept: MATERNAL FETAL MEDICINE | Facility: CLINIC | Age: 39
End: 2024-07-18
Payer: COMMERCIAL

## 2024-07-18 VITALS
BODY MASS INDEX: 30.12 KG/M2 | DIASTOLIC BLOOD PRESSURE: 73 MMHG | WEIGHT: 187.44 LBS | HEIGHT: 66 IN | SYSTOLIC BLOOD PRESSURE: 124 MMHG | HEART RATE: 104 BPM

## 2024-07-18 DIAGNOSIS — O09.812 PREGNANCY RESULTING FROM IN VITRO FERTILIZATION IN SECOND TRIMESTER: ICD-10-CM

## 2024-07-18 DIAGNOSIS — O36.8390 ULTRASOUND SCAN DONE FOR INABILITY TO HEAR FETAL HEART TONES: ICD-10-CM

## 2024-07-18 DIAGNOSIS — O34.32 SHORT CERVIX WITH CERVICAL CERCLAGE, ANTEPARTUM, SECOND TRIMESTER: ICD-10-CM

## 2024-07-18 DIAGNOSIS — N88.3 SHORT CERVIX: Primary | ICD-10-CM

## 2024-07-18 DIAGNOSIS — O26.872 SHORT CERVIX WITH CERVICAL CERCLAGE, ANTEPARTUM, SECOND TRIMESTER: ICD-10-CM

## 2024-07-18 PROCEDURE — 76815 OB US LIMITED FETUS(S): CPT | Mod: S$GLB,,, | Performed by: STUDENT IN AN ORGANIZED HEALTH CARE EDUCATION/TRAINING PROGRAM

## 2024-07-18 PROCEDURE — 76817 TRANSVAGINAL US OBSTETRIC: CPT | Mod: S$GLB,,, | Performed by: STUDENT IN AN ORGANIZED HEALTH CARE EDUCATION/TRAINING PROGRAM

## 2024-07-18 PROCEDURE — 99999 PR PBB SHADOW E&M-EST. PATIENT-LVL III: CPT | Mod: PBBFAC,,, | Performed by: STUDENT IN AN ORGANIZED HEALTH CARE EDUCATION/TRAINING PROGRAM

## 2024-07-18 RX ORDER — CHOLECALCIFEROL (VITAMIN D3) 25 MCG
1000 TABLET ORAL DAILY
COMMUNITY

## 2024-07-18 RX ORDER — FOLIC ACID 0.8 MG
800 TABLET ORAL DAILY
COMMUNITY

## 2024-07-18 RX ORDER — AMOXICILLIN 500 MG
CAPSULE ORAL DAILY
COMMUNITY

## 2024-07-18 NOTE — PROGRESS NOTES
"Maternal Fetal Medicine Follow up  SUBJECTIVE:     Chhaya Orellana is a 39 y.o.  female with IUP at 21w0d who is seen for MFM follow up for management of:    Problem   Short Cervix   Pregnancy Resulting From in Vitro Fertilization in Second Trimester     Previous notes reviewed.   No changes to medical, surgical, family, social, or obstetric history.      Current Outpatient Medications   Medication Instructions    aspirin (ECOTRIN) 81 mg, Oral, Daily    folic acid (FOLVITE) 800 mcg, Oral, Daily    omega-3 fatty acids/fish oil (FISH OIL-OMEGA-3 FATTY ACIDS) 300-1,000 mg capsule Oral, Daily    pantoprazole (PROTONIX) 40 mg, Oral, Daily    PNV 11-iron fum-folic acid-om3 28 mg iron-1 mg -200 mg Cap Oral    vitamin D (VITAMIN D3) 1,000 Units, Oral, Daily     Review of patient's allergies indicates:  No Known Allergies  Care team members:  MD Gianfranco - Primary OB  OBJECTIVE:   Blood Pressure: /73   Pulse 104   Ht 5' 6" (1.676 m)   Wt 85 kg (187 lb 7.3 oz)   LMP 2024   BMI 30.26 kg/m²   Ultrasound performed. See viewpoint for full ultrasound report.  The fetal anatomic survey was completed today, and no fetal structural abnormalities were identified of the structures imaged today.   The MVP is normal.     SSE: cervix closed with cerclage knot visualized  ASSESSMENT/PLAN:     39 y.o.  female with IUP at 21w0d presents for MFM follow up.    Short cervix  Short cervix  Previously counseled  Cerclage placed on   Anatomy completed today    Recommendations:  Follow up growth  in 3 weeks with MD visit  Strict PTL precautions reviewed with patient  Will give patient a list of  mental health counselors as requested    Pregnancy resulting from in vitro fertilization in second trimester  Previously counseled    FOLLOW UP:   F/u in 3 weeks for US/MFM visit      Ramón Jeffrey  Maternal-Fetal Medicine    Electronically Signed by Ramón Jeffrey 2024      "

## 2024-07-18 NOTE — ASSESSMENT & PLAN NOTE
Short cervix  Previously counseled  Cerclage placed on   Anatomy completed today    Recommendations:  Follow up growth  in 3 weeks with MD visit  Strict PTL precautions reviewed with patient  Will give patient a list of  mental health counselors as requested

## 2024-07-30 PROBLEM — O42.919 PRETERM PREMATURE RUPTURE OF MEMBRANES (PPROM) WITH UNKNOWN ONSET OF LABOR: Status: ACTIVE | Noted: 2024-07-30

## 2024-08-19 PROBLEM — O24.410 DIET CONTROLLED GESTATIONAL DIABETES MELLITUS (GDM) IN SECOND TRIMESTER: Status: ACTIVE | Noted: 2024-08-19

## 2024-08-30 ENCOUNTER — TELEPHONE (OUTPATIENT)
Dept: GASTROENTEROLOGY | Facility: CLINIC | Age: 39
End: 2024-08-30
Payer: COMMERCIAL

## 2024-08-30 NOTE — TELEPHONE ENCOUNTER
----- Message from Kate Haji sent at 8/30/2024 10:33 AM CDT -----  Regarding: Needs return call  Type: Needs Medical Advice  Who Called:   [facility]    Fred Call Back Number: [phone]  Additional Information: They were trying to reach dr ovidio kay regarding this patient due to needing a note on this patient from his visit with her

## 2024-08-30 NOTE — TELEPHONE ENCOUNTER
Called and spoke with [facility] nurse who states that she is attempting to reach Dr. Jin to get the hospital note completed from Sunday, Called Endo unit at [facility] and gave the message to Meena who states that she will advise Dr. Jin of this.

## 2024-12-18 ENCOUNTER — PATIENT MESSAGE (OUTPATIENT)
Dept: HEMATOLOGY/ONCOLOGY | Facility: CLINIC | Age: 39
End: 2024-12-18
Payer: COMMERCIAL

## 2024-12-18 ENCOUNTER — DOCUMENTATION ONLY (OUTPATIENT)
Dept: HEMATOLOGY/ONCOLOGY | Facility: CLINIC | Age: 39
End: 2024-12-18
Payer: COMMERCIAL

## 2024-12-18 ENCOUNTER — OFFICE VISIT (OUTPATIENT)
Dept: GASTROENTEROLOGY | Facility: CLINIC | Age: 39
End: 2024-12-18
Payer: COMMERCIAL

## 2024-12-18 VITALS
SYSTOLIC BLOOD PRESSURE: 113 MMHG | TEMPERATURE: 97 F | HEART RATE: 100 BPM | OXYGEN SATURATION: 97 % | WEIGHT: 167.56 LBS | RESPIRATION RATE: 17 BRPM | HEIGHT: 67 IN | BODY MASS INDEX: 26.3 KG/M2 | DIASTOLIC BLOOD PRESSURE: 75 MMHG

## 2024-12-18 DIAGNOSIS — Z80.3 FAMILY HISTORY OF BREAST CANCER: ICD-10-CM

## 2024-12-18 DIAGNOSIS — Z15.09 ATM GENE MUTATION POSITIVE: ICD-10-CM

## 2024-12-18 DIAGNOSIS — Z15.01 ATM GENE MUTATION POSITIVE: ICD-10-CM

## 2024-12-18 DIAGNOSIS — Z91.89 AT HIGH RISK FOR BREAST CANCER: ICD-10-CM

## 2024-12-18 DIAGNOSIS — Z83.719 FAMILY HISTORY OF COLON POLYPS, UNSPECIFIED: Primary | ICD-10-CM

## 2024-12-18 PROCEDURE — 1159F MED LIST DOCD IN RCRD: CPT | Mod: CPTII,S$GLB,, | Performed by: INTERNAL MEDICINE

## 2024-12-18 PROCEDURE — 3074F SYST BP LT 130 MM HG: CPT | Mod: CPTII,S$GLB,, | Performed by: INTERNAL MEDICINE

## 2024-12-18 PROCEDURE — 3044F HG A1C LEVEL LT 7.0%: CPT | Mod: CPTII,S$GLB,, | Performed by: INTERNAL MEDICINE

## 2024-12-18 PROCEDURE — 99999 PR PBB SHADOW E&M-EST. PATIENT-LVL IV: CPT | Mod: PBBFAC,,, | Performed by: INTERNAL MEDICINE

## 2024-12-18 PROCEDURE — 3008F BODY MASS INDEX DOCD: CPT | Mod: CPTII,S$GLB,, | Performed by: INTERNAL MEDICINE

## 2024-12-18 PROCEDURE — 3078F DIAST BP <80 MM HG: CPT | Mod: CPTII,S$GLB,, | Performed by: INTERNAL MEDICINE

## 2024-12-18 PROCEDURE — 99204 OFFICE O/P NEW MOD 45 MIN: CPT | Mod: S$GLB,,, | Performed by: INTERNAL MEDICINE

## 2024-12-18 NOTE — NURSING
Patient seen in [facility] today, office will call to schedule procedures with Dr. Ram. All questions and concerns addressed at this time. Will continue to follow.

## 2024-12-18 NOTE — PROGRESS NOTES
GI Clinic Note    HPI  Chhaya Orellana is a very pleasant 39 y.o.  female who presents with GERD, recent diagnosis of DARWIN mutation, here to discuss GI cancer risks and screening recommendations. Patient reports fam hx of breast cancer in several second degree relatives. She underwent genetic testing and was found to be positive for DARWIN mutation. She reports fam hx of colon polyps in her father, who she believes also had a malignant polyp removed. She states her paternal grandfather had colon cancer. She has never had a colonoscopy.  She reports having GERD symptoms during her recent pregnancy, but none currently.  She has no other GI symptoms to report.       Past Medical History  Past Medical History:   Diagnosis Date    Constipation     with pregnancy    Elderly primigravida     Gestational diabetes     DIET CONTROLLED    In vitro fertilization     Incompetent cervix in pregnancy     Leiomyoma in pregnancy, uterine, antepartum     3-4 cm fibroid    Vulvodynia        Past Surgical History  Past Surgical History:   Procedure Laterality Date    BASAL CELL CARCINOMA EXCISION      CERVICAL CERCLAGE N/A 2024    Procedure: CERCLAGE, CERVIX;  Surgeon: Crescencio Euceda III, MD;  Location: [facility] L&D;  Service: OB/GYN;  Laterality: N/A;     SECTION N/A 2024    Procedure:  SECTION;  Surgeon: Blanche Medel MD;  Location: [facility] L&D;  Service: OB/GYN;  Laterality: N/A;       Medications  Current Outpatient Medications   Medication Instructions    ibuprofen (ADVIL,MOTRIN) 600 mg, Oral, Every 6 hours PRN    labetaloL (NORMODYNE) 100 mg, Oral, Every 8 hours    oxyCODONE-acetaminophen (PERCOCET) 5-325 mg per tablet 1 tablet, Oral, Every 4 hours PRN    pantoprazole (PROTONIX) 40 mg, Daily    PNV 11-iron fum-folic acid-om3 28 mg iron-1 mg -200 mg Cap Take by mouth.        Allergies  Review of patient's allergies indicates:  No Known Allergies      Review of Systems     ROS negative except as otherwise  mentioned in the HPI        Physical Exam    Vitals:    12/18/24 1319   BP: 113/75   Pulse: 100   Resp: 17   Temp: 97.1 °F (36.2 °C)     Physical Examination:     General: well developed, well nourished  Eyes: conjunctivae/corneas clear. PERRL..  HENT: Head:normocephalic, atraumatic. Ears:hearing grossly normal bilaterally. Nose: Nares normal. Septum midline. Mucosa normal. No drainage or sinus tenderness., no discharge. Throat: lips, mucosa, and tongue normal; teeth and gums normal and no throat erythema.  Neck: supple, symmetrical, trachea midline, no JVD and thyroid not enlarged, symmetric, no tenderness/mass/nodules  Lungs:  clear to auscultation bilaterally and normal respiratory effort  Cardiovascular: Heart: regular rate and rhythm, S1, S2 normal, no murmur, click, rub or gallop. Chest Wall: no tenderness. Extremities: no cyanosis or edema, or clubbing. Pulses: 2+ and symmetric.  Abdomen/Rectal: Abdomen: soft, non-tender non-distented; bowel sounds normal; no masses,  no organomegaly. Rectal: not examined  Skin: Skin color, texture, turgor normal. No rashes or lesions  Musculoskeletal:normal gait and no clubbing, cyanosis  Lymph Nodes: No cervical or supraclavicular adenopathy  Neurologic: Normal strength and tone. No focal numbness or weakness  Psych/Behavioral:  Normal. and Alert and oriented, appropriate affect.              Assessment/Plan    GI cancer screening    -Genetic report reviewed.  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  -GI cancer risks discussed. Colon cancer risk is elevated due to fam hx of colon polyps in father and colon cancer in paternal grandfather.  Pancreatic cancer risk is elevated due to DARWIN mutation (lifetime risk 5-7+%, compared to gen pop 1.5%)  -Recommend colonscopy now and at q5 years at minimum  -Recommend EGD/EUS now, then alternating MRI/EUS at q1-2 year interval    2. GERD  - no symptoms currently but will evaluate for secondary causes at time of upcoming  EGD      RTC PRN       At high risk for breast cancer  -     Ambulatory referral/consult to Gastroenterology    Family history of breast cancer  -     Ambulatory referral/consult to Gastroenterology    DARWIN gene mutation positive  -     Ambulatory referral/consult to Gastroenterology

## 2024-12-30 ENCOUNTER — OFFICE VISIT (OUTPATIENT)
Dept: HEMATOLOGY/ONCOLOGY | Facility: CLINIC | Age: 39
End: 2024-12-30
Payer: COMMERCIAL

## 2024-12-30 DIAGNOSIS — Z71.83 ENCOUNTER FOR NONPROCREATIVE GENETIC COUNSELING: Primary | ICD-10-CM

## 2024-12-30 DIAGNOSIS — Z80.3 FAMILY HISTORY OF BREAST CANCER: ICD-10-CM

## 2024-12-30 DIAGNOSIS — Z15.09 ATM GENE MUTATION POSITIVE: ICD-10-CM

## 2024-12-30 DIAGNOSIS — Z91.89 AT HIGH RISK FOR BREAST CANCER: ICD-10-CM

## 2024-12-30 DIAGNOSIS — Z15.01 ATM GENE MUTATION POSITIVE: ICD-10-CM

## 2024-12-30 PROCEDURE — 96040 PR GENETIC COUNSELING, EACH 30 MIN: CPT | Mod: 95,,,

## 2024-12-30 NOTE — LETTER
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 Since mutations are passed directly from parent to child with each child having a 50% chance of inheriting the mutation, this DARWIN mutation could be present in her siblings, children, aunt/uncles, cousins, etc. Genetic counseling and testing is recommended to determine if you or other blood relatives of Chhaya have this mutation. The genetics provider will determine if you only need testing for DARWIN or if you should also be tested for other genes. Please give this letter to your genetics provider.    PROBAND Chhaya Orellana      1985   LAB [facility]   TEST [facility] Common Hereditary Cancers   REQUISITION # [id number]   RESULT DATE 2024   RESULT Pathogenic: DARWIN ([id number])       Genetic counseling appointments:   Anyone interested in pursuing genetic counseling/testing can contact the [facility] to schedule an appointment with a genetics provider by calling [phone]. In-person visits are available in [facility], and [place]. Virtual visits are available for individuals located in [place]. Virtual patients must be able to access the virtual visit through the MyChart ([facility]) portal. Anyone who is unable to see an [facility] provider or prefers an in-person visit with someone closer to home can find a genetic counselor in their area by visiting the website for the National Society of Genetic Counselors (www.NSGC.org) and clicking Find  a Genetic Counselor.     Finding out you have a genetic mutation can be difficult, but knowledge is power, and increased screening or risk-reduction strategies can be recommended to prevent cancer or identify it at an earlier, treatable stage.     Sincerely,    Shawna Chisholm, [facility]  Genetic Counselor  [facility] [place]

## 2024-12-30 NOTE — PROGRESS NOTES
Cancer Genetics  Hereditary and High-Risk Clinic  Department of Hematology and Oncology  [facility]    Date of Service:  24  Visit Provider:  Shawna Chisholm [facility]    Patient ID  Name: Chhaya Orellana    : 1985    MRN: [id number]      Referring Provider  Ania Edward, LISA  [address] [facility] [address]  [address] [place] [id number]    Televisit Information  The patient location is: [place].    The chief complaint leading to consultation is:  As below.    Visit type: audiovisual.      Face-to-face time with patient:  Approximately 50 minutes.    Approximately 75 minutes in total were spent on this encounter, which includes face-to-face time and non-face-to-face time preparing to see the patient (e.g., review of records and tests), obtaining and/or reviewing separately obtained history, documenting clinical information in the electronic or other health record, independently interpreting results (not separately reported) and communicating results to the patient/family/caregiver, or care coordination (not separately reported).  Each patient to whom he or she provides medical services by telemedicine is:  (1) informed of the relationship between the physician and patient and the respective role of any other health care provider with respect to management of the patient; and (2) notified that he or she may decline to receive medical services by telemedicine and may withdraw from such care at any time.    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 We discussed her personal health history, family health history, and the and the results of her genetic testing. 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 Her paternal family history is includes for her father with diangoses of basal cell carcinoma and squamous cell carcinoma in his '40s, an aunt with an  "unknown cancer at age 60, an aunt with breast cancer at age 65, and a grandfather with colon cancer at age 79. Her maternal family history includes her mother with a diagnosis of basal cell carcinoma at age 40, a paternal uncle with thyroid cancer at age 14, an aunt with breast cancer at age 60, and a great aunt with breast cancer diagnosed in her '50s. 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 We discussed the cancer risks, screening, and prevention recommendations for those with DARWIN mutations. All the appropriate referrals have been made, so no additional referrals were placed today.    FOCUSED PERSONAL HISTORY     Chief Complaint: Genetic test results (Pathogenic DARWIN Mutation)    History of Present Illness (HPI):  Chhaya Orellana ("Chhaya"), 39 y.o., assigned female sex at birth, is new to the [facility] of H[facility]ology [facility] and to me.  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    Cancer History  Personal history of basal cell carcinoma (BCC), benign parathyroid adenocarcinoma, and jaw tumors   - two incidences of BCC at age 36 (on her nose and back)   - first noticed those nose blemish in 2010   - treated with Moh's excision   - Parathyroid adenoma removed in 2004 AT [facility] by Dr. Florez  -  thyroid nodules - under active surveillance   -bilateral jaw bone tumors   -biopsied in 2007     Focused Medical History  Previous germline cancer genetic testing:  Yes - Common Hereditary Cancer Panel by [facility]  Colonoscopy: No - first is scheduled for 2025  Mammogram: Yes  Most recent mammogram: 6/7/2024  Breast density: heterogeneously dense   Breast MRI:  Yes  Most recent breast MRI: 12/09/2024  Pancreatitis:  No    Gynecologic History  Age at menarche:  12 years old  Age at " first live childbirth:  39 years old  Menopausal status:  premenopausal  Reproductive organs:  Intact    Hormone Use  Fertility treatments with progesterone and possibly estrogen - [facility] in [place]    Tobacco Use  Tobacco Use: Low Risk  (12/18/2024)    Patient History     Smoking Tobacco Use: Never     Smokeless Tobacco Use: Never     Passive Exposure: Past         FAMILY HISTORY       Maternal ancestry: [place] and [place]  Paternal ancestry: Vietnamese, Slovenian, English,   Ashkenazi Lutheran ancestry: None reported    With her personal history of BCC and jaw tumors an evaluation for Gorlin Syndrome was conducted. The family and personal history were not significant for palmar pits, macrocephaly, dysmorphic facies, skeletal anomalies, cardiac and ovarian fibromas, or medulloblastoma. Additional genetic testing of the PTCH1 and SUFU genes were not recommended today, but this may change with updated personal or family history.     A family history of birth defects, intellectual disability, SIDS, sudden early death, multiple miscarriages and consanguinity were denied. Please refer to above pedigree for further details. A larger copy has been scanned in the Media tab.     DISCUSSION     Genetic Test Results      Dr. Shaver had a sample submitted to [facility] on 12/10/2024 for Common Hereditary Cancer panel testing. This panel includes sequencing and rearrangement testing for the following 48 genes known to be associated with hereditary breast, ovary, uterus, prostate, and gastrointestinal cancer:      APC, DARWIN, AXIN2, BAP1, BARD1, BMPR1A, BRCA1, BRCA2, BRIP1, CDH1, CDK4, CDKN2A, CHEK2, CTNNA1, DICER1, EPCAM, FH, GREM1, HOXB13, KIT, MBD4, MEN1, MLH1, MSH2, MSH3, MSH6, MUTYH, NF1, NTHL1, PALB2, PDGFRA, PMS2, POLD1, POLE, PTEN, RAD51C, RAD51D, SDHA, SDHB, SDHC, SDHD, SMAD4, SMARCA4, STK11, TP53, TSC1, TSC2, VHL     The results of this testing revealed a mutation in DARWIN. This particular mutation is  described as [id number] A>G. This is an intronic variant that induces alternate splicing and may result in absent or altered protein product. Loss of function mutations of the DARWIN gene are known to be pathogenic.  This is considered a positive results and is diagnostic of a hereditary cancer syndrome.     Cancer Risks Related to DARWIN Mutations  Heterozygous mutations in the DARWIN gene have been associated with increased risks of breast, ovarian, pancreatic, prostate, and colorectal cancers.The cancer risk may be related to family history. Individuals with family history of breast cancer and an DARWIN mutation may have a higher lifetime risk of breast cancer than individuals with no family history of breast cancer. There are no risk estimates are available for prostate cancer.    Reproductive Risks  Biallelic mutations in DARWIN cause an autosomal recessive condition called Ataxia-Telangiectasia (AT), which is characterized by progressive ataxia, immunodeficiency, telangiectasias and increased risk of cancers (especially hematologic cancers). When both parents have an DARWIN mutation, there is a 25% chance with each pregnancy for the child to be affected by Ataxia-Telangiectasia. Dr. Shaver can have her partner tested to better understand their reproductive risks. During our appointment she shared that they have 3 embryos frozen, it is possible to do preimplantation genetic testing to determine if her embryos have the DARWIN gene change that she has or if they are affected with AT.     MANAGEMENT RECOMMENDATIONS AND NCCN GUIDELINES    The National Comprehensive Cancer Network (NCCN) recommends that women with an DARWIN mutation undergo:  Breast awareness beginning at 18 years-old  Clinical breast exams every 6-12 months at 30 years-old (or 10 years before the youngest age at breast cancer diagnosis in the family)  Consider annual breast MRI with and without contrast beginning at 30-35 years-old  Annual mammogram with tomosynthesis  beginning at 40 years-old (or 5-10 years earlier than the youngest age at breast cancer diagnosis in the family)    There are no standardized screening recommendations at this time for ovarian cancer screening in the absence of a family history.    The NCCN recommends that men with an DARWIN mutation consider prostate cancer screening starting at age 40.      The NCCN recommends that men and women with an DARWIN mutation undergo the following pancreatic cancer screening:  Annual contrast-enhanced MRI/MRCP and/or endoscopic ultrasound beginning at 50 years-old (or 10 years younger than the earliest pancreatic cancer diagnosis in the family).    There are no standardized colorectal cancer screening recommendations at this time for men and women with DARWIN mutations. Begin colorectal cancer screening at age 45 or 10 years younger than the earliest colorectal cancer diagnosis in the family.                     Personal Management Recommendations For Dr. Shaver Based on NCCN Guidelines  Dr. Shaver's lifetime risk of breast cancer is 21-24% based on this DARWIN gene change. Since she is 39 years old it is recommended she have annual breast cancer screening via breast MRI and mammogram. She is already meeting this recommendation.     With a lifetime risk of pancreatic cancer of 5-10% and no family history, it recommended she begin annual contrast-enhanced MRI/MRCP and/or endoscopic ultrasound at 50 years-old.    With her lifetime risk of ovarian cancer being 2-3% and no family history of ovarian cancer, there are no screening or prevention recommendations for ovarian cancer, It is recommended she be aware of the signs/symptoms of ovarian cancer and to report to her PCP, OBGYN, or other provider as soon as they are noticed. Signs/symptoms include but are not limited to: abdominal bloating or swelling, pelvic or abdominal pain, frequent urination, changes in bowel movements, abnormal vaginal bleeding or discharge, feeling full very soon  after eating, fatigue, and/or unexplained weight loss. A referral to Gynec has been placed so she can discuss her risks and recommendations further with a specialist.     Her lifetime risk of colorectal cancer is 5-10%. Her first colonoscopy with upper endoscopy is scheduled for 2025 and she plans to have regular screening at least every 5 years.    Family Members and Inheritance  We discussed how DARWIN mutations are passed through the family. Chhaya 's daughter has a 50% chance to have inherited the same DARWIN mutation identified in her. Additionally, Chhaya's brother  also has a 50% chance to have inherited the same mutation. We reviewed that DARWIN mutations are passed down for generations, therefore Chhaya inherited this from one of her parents.      It is difficult to determine where the DARWIN mutation originated from given it is a lower penetrance gene. Therefore, we would recommend offering predictive testing to members on both sides of the family.     Pediatric Testing  We discussed that DARWIN mutations are not associated with an increased risk of pediatric cancers and therefore predictive testing is not recommended until age 18.     Dr. Shaver received comprehensive counseling regarding her positive genetic test results. Benefits and limitations of genetic testing were discussed. She was given ample opportunity to ask questions and all of her concerns were addressed. Dr. Shaver is encouraged to contact us with any changes to her personal or family history, or if she has any questions or concerns.     ASSESSMENT / PLAN      Post-test genetic counseling was provided for Dr. Shaver. She had the Common Hereditary Cancer Panel by completed and a pathogenic DARWIN mutation was identified . We discussed the cancer risks related to this DARWIN mutation as well as personalized screening recommendations based on this finding, her personal health history, and family health history. The proper referrals to the High Risk Breast Clinic,  Gynecologic Oncology, and Gastroenterology have already been placed. She plans to continue or begin the recommended cancer screenings in 2025. A family letter has been drafted that provides details about the familial DARWIN mutation and information about how to get genetic testing done. She can distribute this letter to her relatives as she sees fit.       ICD-10-CM ICD-9-CM   1. Encounter for nonprocreative genetic counseling  Z71.83 V26.33   2. At high risk for breast cancer  Z91.89 V49.89   3. Family history of breast cancer  Z80.3 V16.3   4. DARWIN gene mutation positive  Z15.01 V84.01    Z15.09 V84.09     1. Encounter for nonprocreative genetic counseling    2. At high risk for breast cancer  - Ambulatory referral/consult to Genetics    3. Family history of breast cancer  - Ambulatory referral/consult to Genetics    4. DARWIN gene mutation positive  - Ambulatory referral/consult to Genetics         Follow-up: Dr. Shaver and I will have a follow-up in two years time to check in and review any updates to cancer risks and prevention for those with DARWIN mutations.      Approximately 50 minutes were spent face-to-face with the patient.  Approximately 75 minutes in total were spent on this encounter, which includes face-to-face time and non-face-to-face time preparing to see the patient (e.g., review of tests), obtaining and/or reviewing separately obtained history, documenting clinical information in the electronic or other health record, independently interpreting results (not separately reported) and communicating results to the patient/family/caregiver, or care coordination (not separately reported).     This assessment is based on the history and reports provided, as well as the current scientific knowledge regarding cancer genetics.         Shawna Chisholm, [facility]  Genetic Counselor, Hereditary and High-Risk Clinic  Department of Hematology and Oncology  [facility]        CC:  Ania Edward

## 2025-02-25 ENCOUNTER — PATIENT MESSAGE (OUTPATIENT)
Dept: HEMATOLOGY/ONCOLOGY | Facility: CLINIC | Age: 40
End: 2025-02-25
Payer: COMMERCIAL

## 2025-02-25 ENCOUNTER — DOCUMENTATION ONLY (OUTPATIENT)
Dept: HEMATOLOGY/ONCOLOGY | Facility: CLINIC | Age: 40
End: 2025-02-25
Payer: COMMERCIAL

## 2025-03-03 ENCOUNTER — TELEPHONE (OUTPATIENT)
Dept: HEMATOLOGY/ONCOLOGY | Facility: CLINIC | Age: 40
End: 2025-03-03
Payer: COMMERCIAL

## 2025-03-03 NOTE — NURSING
Spoke with pt.  There are no other openings for Dr. Sharma here at the cancer center this month.  I gave her the option of calling Dr. Sharma's private office to see what they had available.  She decided to keep the appt here on 3/5.  Asked her to call if something changes.  She thanked me and verbalized understanding.

## 2025-03-03 NOTE — TELEPHONE ENCOUNTER
----- Message from Tarah sent at 3/3/2025  4:21 PM CST -----  Type: Needs Medical AdviceWho Called:  ptBest Call Back Number: [phone] Additional Information: requesting a call back to reschedule for this week or the 10th. If nothing is available pt will keep current appt

## 2025-03-05 ENCOUNTER — OFFICE VISIT (OUTPATIENT)
Dept: GYNECOLOGIC ONCOLOGY | Facility: CLINIC | Age: 40
End: 2025-03-05
Payer: COMMERCIAL

## 2025-03-05 ENCOUNTER — TELEPHONE (OUTPATIENT)
Dept: HEMATOLOGY/ONCOLOGY | Facility: CLINIC | Age: 40
End: 2025-03-05
Payer: COMMERCIAL

## 2025-03-05 VITALS
TEMPERATURE: 98 F | BODY MASS INDEX: 26.23 KG/M2 | DIASTOLIC BLOOD PRESSURE: 75 MMHG | HEART RATE: 106 BPM | WEIGHT: 167.13 LBS | SYSTOLIC BLOOD PRESSURE: 127 MMHG | RESPIRATION RATE: 16 BRPM | OXYGEN SATURATION: 98 % | HEIGHT: 67 IN

## 2025-03-05 DIAGNOSIS — Z15.01 ATM GENE MUTATION POSITIVE: Primary | ICD-10-CM

## 2025-03-05 DIAGNOSIS — Z80.3 FAMILY HISTORY OF BREAST CANCER: ICD-10-CM

## 2025-03-05 DIAGNOSIS — Z15.09 ATM GENE MUTATION POSITIVE: Primary | ICD-10-CM

## 2025-03-05 DIAGNOSIS — Z15.09 ATM GENE MUTATION POSITIVE: ICD-10-CM

## 2025-03-05 DIAGNOSIS — Z15.01 ATM GENE MUTATION POSITIVE: ICD-10-CM

## 2025-03-05 DIAGNOSIS — Z91.89 AT HIGH RISK FOR BREAST CANCER: ICD-10-CM

## 2025-03-05 PROCEDURE — 3008F BODY MASS INDEX DOCD: CPT | Mod: CPTII,S$GLB,, | Performed by: OBSTETRICS & GYNECOLOGY

## 2025-03-05 PROCEDURE — 3074F SYST BP LT 130 MM HG: CPT | Mod: CPTII,S$GLB,, | Performed by: OBSTETRICS & GYNECOLOGY

## 2025-03-05 PROCEDURE — 3078F DIAST BP <80 MM HG: CPT | Mod: CPTII,S$GLB,, | Performed by: OBSTETRICS & GYNECOLOGY

## 2025-03-05 PROCEDURE — 99999 PR PBB SHADOW E&M-EST. PATIENT-LVL IV: CPT | Mod: PBBFAC,,, | Performed by: OBSTETRICS & GYNECOLOGY

## 2025-03-05 PROCEDURE — 99204 OFFICE O/P NEW MOD 45 MIN: CPT | Mod: S$GLB,,, | Performed by: OBSTETRICS & GYNECOLOGY

## 2025-03-05 RX ORDER — MULTIVITAMIN WITH IRON
TABLET ORAL DAILY
COMMUNITY

## 2025-03-05 RX ORDER — PSYLLIUM HUSK 0.4 G
1 CAPSULE ORAL 2 TIMES DAILY WITH MEALS
COMMUNITY

## 2025-03-05 NOTE — PROGRESS NOTES
Subjective:      Chief Complaint: Heterozygous pathogenic DARWIN gene mutation     Patient ID: Chhaya Orellana is a 39 y.o. female referred by Ania Edward NP, after recent genetic testing revealed a pathogenic DARWIN gene mutation.  This mutation is associated with an elevated risk of breast cancer, approximately 21-24% lifetime risk, pancreatic cancer with a 5-10% lifetime risk and ovarian cancer with a modest increase of 2-3% lifetime risk.  Her GYN history is positive for a remote history of HPV and infertility with an embryo transfer and a  at 28 weeks gestation. Other personal history includes 2 basal cell carcinomas and resection of a parathyroid adenoma.  She is followed by Dr. Brink for routine GYN care.          Past Medical History:   Diagnosis Date    DARWIN gene mutation positive     BRCA1 gene mutation negative     BRCA2 gene mutation negative     Constipation     with pregnancy    Elderly primigravida     Gestational diabetes     DIET CONTROLLED    In vitro fertilization     Incompetent cervix in pregnancy     Leiomyoma in pregnancy, uterine, antepartum     3-4 cm fibroid    Vulvodynia       Past Surgical History:   Procedure Laterality Date    BASAL CELL CARCINOMA EXCISION      CERVICAL CERCLAGE N/A 2024    Procedure: CERCLAGE, CERVIX;  Surgeon: Crescencio Euceda III, MD;  Location: [facility] L&D;  Service: OB/GYN;  Laterality: N/A;     SECTION N/A 2024    Procedure:  SECTION;  Surgeon: Blanche Medel MD;  Location: [facility] L&D;  Service: OB/GYN;  Laterality: N/A;      Family History   Problem Relation Name Age of Onset    Colon cancer Paternal Grandfather Ke 79        Tx: surgery    Basal cell carcinoma Mother Nallely 40        Tx: surgery    Squamous cell carcinoma Father Ronn 40 - 49    Basal cell carcinoma Father Ronn 40 - 49    Breast cancer Other Marily 60        Tx: bilateral mastectomy    Thyroid cancer Maternal Uncle Haile 14        Tx: surgery    Cancer Paternal  Aunt Camelia 60    Breast cancer Paternal Aunt Hyacinth 65        Tx: Lumpectomy and radiation    Breast cancer Other  50 - 59      Social History[1]           Objective:        1. General: Well appearing, no apparent distress, alert and oriented.  2. Lymph: Neck symmetric without cervical or supraclavicular adenopathy or mass.  3. Heart:  4. Lungs: Normal respiratory rate, no accessory muscle use.  5. Psych: Normal affect.  6. Abdomen:    7. Skin: Warm, dry, no rashes or lesions.   8. Extremities: Bilateral lower extremities without edema or tenderness.  9. Genitourinary               Pelvic Examination: deferred.                   Assessment/Plan       A long discussion was held with the patient and questions were answered.  The patient is being followed by Dr. Rausch for her increased risk of breast cancer and will be evaluated by Dr. Ram for her increased risk of pancreatic cancer.  We discussed the lack of adequate screening procedures for ovarian cancer and a protective effect of birth control pills.  There is currently no recommendations for surveillance for ovarian cancer, especially with no family history of this malignancy.  She is encouraged to follow up with Dr. Brink for routine GYN care and return here as needed.        At high risk for breast cancer  -     Ambulatory referral/consult to Gynecologic Oncology    Family history of breast cancer  -     Ambulatory referral/consult to Gynecologic Oncology    DARWIN gene mutation positive  -     Ambulatory referral/consult to Gynecologic Oncology                    [1]   Social History  Tobacco Use    Smoking status: Never     Passive exposure: Past    Smokeless tobacco: Never

## 2025-03-26 PROBLEM — Z15.09 ATM GENE MUTATION POSITIVE: Status: ACTIVE | Noted: 2025-03-26

## 2025-03-26 PROBLEM — Z80.3 FAMILY HISTORY OF BREAST CANCER: Status: ACTIVE | Noted: 2025-03-26

## 2025-03-26 PROBLEM — Z15.01 ATM GENE MUTATION POSITIVE: Status: ACTIVE | Noted: 2025-03-26

## 2025-03-26 PROBLEM — Z91.89 AT HIGH RISK FOR BREAST CANCER: Status: ACTIVE | Noted: 2025-03-26

## 2025-06-02 ENCOUNTER — HOSPITAL ENCOUNTER (OUTPATIENT)
Dept: RADIOLOGY | Facility: HOSPITAL | Age: 40
Discharge: HOME | End: 2025-06-02
Attending: NURSE PRACTITIONER
Payer: COMMERCIAL

## 2025-06-02 DIAGNOSIS — Z91.89 AT HIGH RISK FOR BREAST CANCER: ICD-10-CM

## 2025-06-02 DIAGNOSIS — Z12.31 ENCOUNTER FOR SCREENING MAMMOGRAM FOR HIGH-RISK PATIENT: ICD-10-CM

## 2025-06-02 PROCEDURE — 77063 BREAST TOMOSYNTHESIS BI: CPT | Mod: 26,,, | Performed by: RADIOLOGY

## 2025-06-02 PROCEDURE — 76641 ULTRASOUND BREAST COMPLETE: CPT | Mod: TC,50,PO

## 2025-06-02 PROCEDURE — 77067 SCR MAMMO BI INCL CAD: CPT | Mod: 26,,, | Performed by: RADIOLOGY

## 2025-06-02 PROCEDURE — 77067 SCR MAMMO BI INCL CAD: CPT | Mod: TC,PO

## 2025-06-02 PROCEDURE — 76641 ULTRASOUND BREAST COMPLETE: CPT | Mod: 26,50,, | Performed by: RADIOLOGY

## 2025-06-05 ENCOUNTER — HOSPITAL ENCOUNTER (OUTPATIENT)
Dept: RADIOLOGY | Facility: HOSPITAL | Age: 40
Discharge: HOME | End: 2025-06-05
Attending: NURSE PRACTITIONER
Payer: COMMERCIAL

## 2025-06-05 DIAGNOSIS — R92.8 ABNORMAL MAMMOGRAM OF LEFT BREAST: ICD-10-CM

## 2025-06-05 PROCEDURE — 77065 DX MAMMO INCL CAD UNI: CPT | Mod: TC,PO,LT

## 2025-06-05 PROCEDURE — 63600175 PHARM REV CODE 636 W HCPCS: Mod: PO | Performed by: NURSE PRACTITIONER

## 2025-06-05 PROCEDURE — 88305 TISSUE EXAM BY PATHOLOGIST: CPT | Mod: TC | Performed by: NURSE PRACTITIONER

## 2025-06-05 PROCEDURE — 27202663 US BREAST BIOPSY WITH IMAGING 1ST SITE LEFT: Mod: PO

## 2025-06-05 PROCEDURE — 77065 DX MAMMO INCL CAD UNI: CPT | Mod: 26,LT,, | Performed by: RADIOLOGY

## 2025-06-05 RX ORDER — LIDOCAINE HCL/EPINEPHRINE/PF 2%-1:200K
8 VIAL (ML) INJECTION ONCE
Status: COMPLETED | OUTPATIENT
Start: 2025-06-05 | End: 2025-06-05

## 2025-06-05 RX ORDER — LIDOCAINE HYDROCHLORIDE 10 MG/ML
1 INJECTION, SOLUTION INFILTRATION; PERINEURAL ONCE
Status: COMPLETED | OUTPATIENT
Start: 2025-06-05 | End: 2025-06-05

## 2025-06-05 RX ADMIN — LIDOCAINE HYDROCHLORIDE 1 ML: 10 INJECTION, SOLUTION EPIDURAL; INFILTRATION; INTRACAUDAL; PERINEURAL at 10:06

## 2025-06-05 RX ADMIN — LIDOCAINE HYDROCHLORIDE,EPINEPHRINE BITARTRATE 8 ML: 20; .005 INJECTION, SOLUTION EPIDURAL; INFILTRATION; INTRACAUDAL; PERINEURAL at 10:06

## 2025-06-06 ENCOUNTER — RESULTS FOLLOW-UP (OUTPATIENT)
Dept: RADIOLOGY | Facility: HOSPITAL | Age: 40
End: 2025-06-06

## 2025-06-06 ENCOUNTER — TELEPHONE (OUTPATIENT)
Dept: RADIOLOGY | Facility: HOSPITAL | Age: 40
End: 2025-06-06
Payer: COMMERCIAL

## 2025-06-06 LAB
DHEA SERPL-MCNC: NORMAL
ESTROGEN SERPL-MCNC: NORMAL PG/ML
INSULIN SERPL-ACNC: NORMAL U[IU]/ML
LAB AP CLINICAL INFORMATION: NORMAL
LAB AP GROSS DESCRIPTION: NORMAL
LAB AP PERFORMING LOCATION(S): NORMAL
LAB AP REPORT FOOTNOTES: NORMAL